# Patient Record
Sex: FEMALE | Race: ASIAN | Employment: FULL TIME | URBAN - METROPOLITAN AREA
[De-identification: names, ages, dates, MRNs, and addresses within clinical notes are randomized per-mention and may not be internally consistent; named-entity substitution may affect disease eponyms.]

---

## 2018-08-15 LAB
ANTIBODY SCREEN, EXTERNAL: NEGATIVE
CHLAMYDIA, EXTERNAL: NEGATIVE
HBSAG, EXTERNAL: NEGATIVE
HIV, EXTERNAL: NEGATIVE
N. GONORRHEA, EXTERNAL: NEGATIVE
RPR, EXTERNAL: NON REACTIVE
RUBELLA, EXTERNAL: NORMAL
TYPE, ABO & RH, EXTERNAL: NORMAL

## 2018-10-11 ENCOUNTER — HOSPITAL ENCOUNTER (EMERGENCY)
Age: 23
Discharge: HOME | End: 2018-10-11
Attending: EMERGENCY MEDICINE | Admitting: EMERGENCY MEDICINE
Payer: MEDICAID

## 2018-10-11 VITALS
HEART RATE: 76 BPM | DIASTOLIC BLOOD PRESSURE: 62 MMHG | WEIGHT: 99 LBS | BODY MASS INDEX: 18.22 KG/M2 | HEIGHT: 62 IN | OXYGEN SATURATION: 100 % | RESPIRATION RATE: 16 BRPM | SYSTOLIC BLOOD PRESSURE: 100 MMHG | TEMPERATURE: 97.6 F

## 2018-10-11 DIAGNOSIS — D72.829 LEUKOCYTOSIS, UNSPECIFIED TYPE: ICD-10-CM

## 2018-10-11 DIAGNOSIS — N39.0 URINARY TRACT INFECTION WITHOUT HEMATURIA, SITE UNSPECIFIED: Primary | ICD-10-CM

## 2018-10-11 LAB
ALBUMIN SERPL-MCNC: 3 G/DL (ref 3.4–5)
ALBUMIN/GLOB SERPL: 0.6 {RATIO} (ref 0.8–1.7)
ALP SERPL-CCNC: 82 U/L (ref 45–117)
ALT SERPL-CCNC: 15 U/L (ref 13–56)
ANION GAP SERPL CALC-SCNC: 11 MMOL/L (ref 3–18)
APPEARANCE UR: CLEAR
AST SERPL-CCNC: 14 U/L (ref 15–37)
BACTERIA URNS QL MICRO: ABNORMAL /HPF
BASOPHILS # BLD: 0 K/UL (ref 0–0.1)
BASOPHILS NFR BLD: 0 % (ref 0–2)
BILIRUB SERPL-MCNC: 0.3 MG/DL (ref 0.2–1)
BILIRUB UR QL: NEGATIVE
BUN SERPL-MCNC: 4 MG/DL (ref 7–18)
BUN/CREAT SERPL: 6 (ref 12–20)
CALCIUM SERPL-MCNC: 9.3 MG/DL (ref 8.5–10.1)
CHLORIDE SERPL-SCNC: 100 MMOL/L (ref 100–108)
CO2 SERPL-SCNC: 26 MMOL/L (ref 21–32)
COLOR UR: YELLOW
CREAT SERPL-MCNC: 0.66 MG/DL (ref 0.6–1.3)
DIFFERENTIAL METHOD BLD: ABNORMAL
EOSINOPHIL # BLD: 0 K/UL (ref 0–0.4)
EOSINOPHIL NFR BLD: 0 % (ref 0–5)
EPITH CASTS URNS QL MICRO: ABNORMAL /LPF (ref 0–5)
ERYTHROCYTE [DISTWIDTH] IN BLOOD BY AUTOMATED COUNT: 13 % (ref 11.6–14.5)
GLOBULIN SER CALC-MCNC: 4.7 G/DL (ref 2–4)
GLUCOSE SERPL-MCNC: 105 MG/DL (ref 74–99)
GLUCOSE UR STRIP.AUTO-MCNC: NEGATIVE MG/DL
HCT VFR BLD AUTO: 31.8 % (ref 35–45)
HGB BLD-MCNC: 10.9 G/DL (ref 12–16)
HGB UR QL STRIP: NEGATIVE
KETONES UR QL STRIP.AUTO: 40 MG/DL
LEUKOCYTE ESTERASE UR QL STRIP.AUTO: ABNORMAL
LYMPHOCYTES # BLD: 1.3 K/UL (ref 0.9–3.6)
LYMPHOCYTES NFR BLD: 8 % (ref 21–52)
MCH RBC QN AUTO: 28.8 PG (ref 24–34)
MCHC RBC AUTO-ENTMCNC: 34.3 G/DL (ref 31–37)
MCV RBC AUTO: 83.9 FL (ref 74–97)
MONOCYTES # BLD: 1.5 K/UL (ref 0.05–1.2)
MONOCYTES NFR BLD: 10 % (ref 3–10)
NEUTS SEG # BLD: 12.6 K/UL (ref 1.8–8)
NEUTS SEG NFR BLD: 82 % (ref 40–73)
NITRITE UR QL STRIP.AUTO: NEGATIVE
PH UR STRIP: 6.5 [PH] (ref 5–8)
PLATELET # BLD AUTO: 307 K/UL (ref 135–420)
PMV BLD AUTO: 9.3 FL (ref 9.2–11.8)
POTASSIUM SERPL-SCNC: 4 MMOL/L (ref 3.5–5.5)
PROT SERPL-MCNC: 7.7 G/DL (ref 6.4–8.2)
PROT UR STRIP-MCNC: NEGATIVE MG/DL
RBC # BLD AUTO: 3.79 M/UL (ref 4.2–5.3)
RBC #/AREA URNS HPF: ABNORMAL /HPF (ref 0–5)
SODIUM SERPL-SCNC: 137 MMOL/L (ref 136–145)
SP GR UR REFRACTOMETRY: 1 (ref 1–1.03)
UROBILINOGEN UR QL STRIP.AUTO: 0.2 EU/DL (ref 0.2–1)
WBC # BLD AUTO: 15.5 K/UL (ref 4.6–13.2)
WBC URNS QL MICRO: ABNORMAL /HPF (ref 0–5)

## 2018-10-11 PROCEDURE — 74011250637 HC RX REV CODE- 250/637: Performed by: PHYSICIAN ASSISTANT

## 2018-10-11 PROCEDURE — 96374 THER/PROPH/DIAG INJ IV PUSH: CPT

## 2018-10-11 PROCEDURE — 87077 CULTURE AEROBIC IDENTIFY: CPT | Performed by: PHYSICIAN ASSISTANT

## 2018-10-11 PROCEDURE — 74011000250 HC RX REV CODE- 250: Performed by: PHYSICIAN ASSISTANT

## 2018-10-11 PROCEDURE — 87086 URINE CULTURE/COLONY COUNT: CPT | Performed by: PHYSICIAN ASSISTANT

## 2018-10-11 PROCEDURE — 85025 COMPLETE CBC W/AUTO DIFF WBC: CPT | Performed by: PHYSICIAN ASSISTANT

## 2018-10-11 PROCEDURE — 74011250636 HC RX REV CODE- 250/636: Performed by: PHYSICIAN ASSISTANT

## 2018-10-11 PROCEDURE — 96361 HYDRATE IV INFUSION ADD-ON: CPT

## 2018-10-11 PROCEDURE — 80053 COMPREHEN METABOLIC PANEL: CPT | Performed by: PHYSICIAN ASSISTANT

## 2018-10-11 PROCEDURE — 87184 SC STD DISK METHOD PER PLATE: CPT | Performed by: PHYSICIAN ASSISTANT

## 2018-10-11 PROCEDURE — 81001 URINALYSIS AUTO W/SCOPE: CPT | Performed by: PHYSICIAN ASSISTANT

## 2018-10-11 PROCEDURE — 99283 EMERGENCY DEPT VISIT LOW MDM: CPT

## 2018-10-11 RX ORDER — NITROFURANTOIN 25; 75 MG/1; MG/1
100 CAPSULE ORAL 2 TIMES DAILY
Qty: 6 CAP | Refills: 0 | Status: SHIPPED | OUTPATIENT
Start: 2018-10-11 | End: 2018-10-14

## 2018-10-11 RX ADMIN — SODIUM CHLORIDE 1000 ML: 900 INJECTION, SOLUTION INTRAVENOUS at 22:29

## 2018-10-11 RX ADMIN — CEFTRIAXONE 1 G: 1 INJECTION, POWDER, FOR SOLUTION INTRAMUSCULAR; INTRAVENOUS at 22:28

## 2018-10-11 RX ADMIN — POTASSIUM BICARBONATE 50 MEQ: 25 TABLET, EFFERVESCENT ORAL at 21:16

## 2018-10-11 NOTE — ED TRIAGE NOTES
Pt arrives ambulatory to ED with c\o fever, pt denies cough, n/v/d, SOB, dizziness, CP, pt is able to make needs known speaking in complete sentences, pt in nad ta this time

## 2018-10-12 NOTE — DISCHARGE INSTRUCTIONS
Urinary Tract Infection in Pregnancy: Care Instructions  Your Care Instructions    A urinary tract infection, or UTI, is an infection of the bladder and other urinary structures. Most UTIs occur in the bladder. They often cause pain or burning when you urinate. UTI is the most common bacterial infection in pregnancy. If untreated, a UTI could lead to problems such as a kidney infection or  labor. Most UTIs can be cured with antibiotics. Your doctor will prescribe an antibiotic that is safe during pregnancy. Be sure to finish your medicine so that the infection does not spread to your kidneys. Follow-up care is a key part of your treatment and safety. Be sure to make and go to all appointments, and call your doctor if you are having problems. It's also a good idea to know your test results and keep a list of the medicines you take. How can you care for yourself at home? · Take your antibiotics as directed. Do not stop taking them just because you feel better. You need to take the full course of antibiotics. · Drink extra water and other fluids for the next day or two. This will help wash out the bacteria causing the infection. If you have kidney, heart, or liver disease and have to limit fluids, talk with your doctor before you increase the amount of fluids you drink. · Do not drink alcohol. · Urinate often. Try to empty your bladder each time. Preventing UTIs  · Drink plenty of fluids, enough so that your urine is light yellow or clear like water. This helps you urinate often, which clears bacteria from your system. If you have kidney, heart, or liver disease and have to limit fluids, talk with your doctor before you increase the amount of fluids you drink. · Urinate when you first have the urge. · Urinate right after you have sex. This is the best way for women to avoid UTIs. · When going to the bathroom, wipe from front to back to keep bacteria from entering the vagina or urethra.   When should you call for help? Call your doctor now or seek immediate medical care if:    · You have symptoms of a worse urinary tract infection. These may include:  ¨ Pain or burning when you urinate. ¨ A frequent need to urinate without being able to pass much urine. ¨ Pain in the flank, which is just below the rib cage and above the waist on either side of the back. ¨ Blood in your urine. ¨ A fever.    Watch closely for changes in your health, and be sure to contact your doctor if:    · You do not get better as expected. Where can you learn more? Go to http://cruz-nicole.info/. Enter M982 in the search box to learn more about \"Urinary Tract Infection in Pregnancy: Care Instructions. \"  Current as of: November 21, 2017  Content Version: 11.8  © 7388-4265 [employer], [employer]. Care instructions adapted under license by [employer] (which disclaims liability or warranty for this information). If you have questions about a medical condition or this instruction, always ask your healthcare professional. [place] [address] any warranty or liability for your use of this information.

## 2018-10-12 NOTE — ED PROVIDER NOTES
EMERGENCY DEPARTMENT HISTORY AND PHYSICAL EXAM 
 
Date: 10/11/2018 Patient Name: Joan Carlson History of Presenting Illness Chief Complaint Patient presents with  Fever History Provided By: Patient Chief Complaint: Fever Duration: 1 week Timing:  Progressive Location: General 
Severity: Tmax 102.5 F Modifying Factors: Fever is responsive to Tylenol Associated Symptoms:  back pain, chills, neck stiffness (resolved), vomiting (1 episode), dizziness (intermittent), and cough Additional History (Context):  
8:48 PM 
Rosmery Kimbrough is a 23 y.o. female with PMHX of anemia who presents to the emergency department C/O a fever at home that was responsive to Tylenol and continues to decrease. Associated sxs include lower back pain, chills, neck stiffness (x 1 day-resolved), vomiting (1 episode days ago), dizziness (intermittent). Reports that most of her cold symptoms have resolved but she still has a fever, chills, and body aches. Pt is 17 weeks pregnant (R8D8) without complications and has had a US ([facility]). Pt denies abdominal pain, vaginal bleeding, vaginal discharge, and any other sxs or complaints. PCP: Shanthi Goodman MD 
 
 
 
Past History Past Medical History: 
History reviewed. No pertinent past medical history. Past Surgical History: 
History reviewed. No pertinent surgical history. Family History: 
History reviewed. No pertinent family history. Social History: 
Social History Substance Use Topics  Smoking status: Never Smoker  Smokeless tobacco: Never Used  Alcohol use No  
 
 
Allergies: 
No Known Allergies Review of Systems Review of Systems Constitutional: Positive for chills and fever (Tmax 102.5 F). Respiratory: Positive for cough. Gastrointestinal: Positive for vomiting (1 episode). Negative for abdominal pain. Genitourinary: Negative for vaginal bleeding and vaginal discharge. Musculoskeletal: Positive for back pain, myalgias (body aches) and neck stiffness (resolved). Neurological: Positive for dizziness. All other systems reviewed and are negative. Physical Exam  
 
Vitals:  
 10/11/18 1950 BP: 99/60 Pulse: 86 Resp: 18 Temp: 97.6 °F (36.4 °C) SpO2: 100% Weight: 44.9 kg (99 lb) Height: 5' 2\" (1.575 m) Physical Exam  
Constitutional: She is oriented to person, place, and time. She appears well-developed and well-nourished. No distress. Alert, well appearing, non toxic, speaking in full sentences without difficulty HENT:  
Head: Normocephalic and atraumatic. Right Ear: Tympanic membrane, external ear and ear canal normal. Tympanic membrane is not perforated, not erythematous, not retracted and not bulging. Left Ear: Tympanic membrane, external ear and ear canal normal. Tympanic membrane is not perforated, not erythematous, not retracted and not bulging. Nose: Nose normal. No mucosal edema or rhinorrhea. Right sinus exhibits no maxillary sinus tenderness and no frontal sinus tenderness. Left sinus exhibits no maxillary sinus tenderness and no frontal sinus tenderness. Mouth/Throat: Uvula is midline, oropharynx is clear and moist and mucous membranes are normal. Mucous membranes are not dry. No uvula swelling. No oropharyngeal exudate, posterior oropharyngeal edema, posterior oropharyngeal erythema or tonsillar abscesses. Neck: Normal range of motion. Neck supple. Cardiovascular: Normal rate, regular rhythm, normal heart sounds and intact distal pulses. No murmur heard. Pulmonary/Chest: Effort normal and breath sounds normal. No respiratory distress. She has no wheezes. She has no rales. Abdominal: Soft. Bowel sounds are normal. She exhibits no distension. There is no tenderness. There is no rebound and no guarding. Lymphadenopathy:  
  She has no cervical adenopathy. Neurological: She is alert and oriented to person, place, and time. Skin: Skin is warm and dry. No rash noted. Psychiatric: She has a normal mood and affect. Judgment normal.  
Nursing note and vitals reviewed. Diagnostic Study Results Labs - Recent Results (from the past 12 hour(s)) URINALYSIS W/ RFLX MICROSCOPIC Collection Time: 10/11/18  9:00 PM  
Result Value Ref Range Color YELLOW Appearance CLEAR Specific gravity 1.005 1.005 - 1.030    
 pH (UA) 6.5 5.0 - 8.0 Protein NEGATIVE  NEG mg/dL Glucose NEGATIVE  NEG mg/dL Ketone 40 (A) NEG mg/dL Bilirubin NEGATIVE  NEG Blood NEGATIVE  NEG Urobilinogen 0.2 0.2 - 1.0 EU/dL Nitrites NEGATIVE  NEG Leukocyte Esterase MODERATE (A) NEG URINE MICROSCOPIC ONLY Collection Time: 10/11/18  9:00 PM  
Result Value Ref Range WBC 11 to 20 0 - 5 /hpf  
 RBC 0 to 3 0 - 5 /hpf Epithelial cells FEW 0 - 5 /lpf Bacteria 2+ (A) NEG /hpf  
CBC WITH AUTOMATED DIFF Collection Time: 10/11/18  9:50 PM  
Result Value Ref Range WBC 15.5 (H) 4.6 - 13.2 K/uL  
 RBC 3.79 (L) 4.20 - 5.30 M/uL  
 HGB 10.9 (L) 12.0 - 16.0 g/dL HCT 31.8 (L) 35.0 - 45.0 % MCV 83.9 74.0 - 97.0 FL  
 MCH 28.8 24.0 - 34.0 PG  
 MCHC 34.3 31.0 - 37.0 g/dL  
 RDW 13.0 11.6 - 14.5 % PLATELET 152 008 - 627 K/uL MPV 9.3 9.2 - 11.8 FL  
 NEUTROPHILS 82 (H) 40 - 73 % LYMPHOCYTES 8 (L) 21 - 52 % MONOCYTES 10 3 - 10 % EOSINOPHILS 0 0 - 5 % BASOPHILS 0 0 - 2 %  
 ABS. NEUTROPHILS 12.6 (H) 1.8 - 8.0 K/UL  
 ABS. LYMPHOCYTES 1.3 0.9 - 3.6 K/UL  
 ABS. MONOCYTES 1.5 (H) 0.05 - 1.2 K/UL  
 ABS. EOSINOPHILS 0.0 0.0 - 0.4 K/UL  
 ABS. BASOPHILS 0.0 0.0 - 0.1 K/UL  
 DF AUTOMATED METABOLIC PANEL, COMPREHENSIVE Collection Time: 10/11/18  9:50 PM  
Result Value Ref Range Sodium 137 136 - 145 mmol/L Potassium 4.0 3.5 - 5.5 mmol/L Chloride 100 100 - 108 mmol/L  
 CO2 26 21 - 32 mmol/L Anion gap 11 3.0 - 18 mmol/L Glucose 105 (H) 74 - 99 mg/dL  BUN 4 (L) 7.0 - 18 MG/DL  
 Creatinine 0.66 0.6 - 1.3 MG/DL  
 BUN/Creatinine ratio 6 (L) 12 - 20 GFR est AA >60 >60 ml/min/1.73m2 GFR est non-AA >60 >60 ml/min/1.73m2 Calcium 9.3 8.5 - 10.1 MG/DL Bilirubin, total 0.3 0.2 - 1.0 MG/DL  
 ALT (SGPT) 15 13 - 56 U/L  
 AST (SGOT) 14 (L) 15 - 37 U/L Alk. phosphatase 82 45 - 117 U/L Protein, total 7.7 6.4 - 8.2 g/dL Albumin 3.0 (L) 3.4 - 5.0 g/dL Globulin 4.7 (H) 2.0 - 4.0 g/dL A-G Ratio 0.6 (L) 0.8 - 1.7 Radiologic Studies - No orders to display CT Results  (Last 48 hours) None CXR Results  (Last 48 hours) None Medications given in the ED- Medications  
potassium bicarbonate (KLYTE) tablet 50 mEq (50 mEq Oral Given 10/11/18 2116) cefTRIAXone (ROCEPHIN) 1 g in sterile water (preservative free) 10 mL IV syringe (1 g IntraVENous Given 10/11/18 2228)  
sodium chloride 0.9 % bolus infusion 1,000 mL (0 mL IntraVENous IV Completed 10/11/18 2300) Medical Decision Making I am the first provider for this patient. I reviewed the vital signs, available nursing notes, past medical history, past surgical history, family history and social history. Vital Signs-Reviewed the patient's vital signs. Pulse Oximetry Analysis - 100% on RA Records Reviewed: Nursing Notes and Old Medical Records Provider Notes (Medical Decision Making):  
Pt 17 weeks pregnant, US confirmed IUP, presents with intermittent fever this week. Had URI sxs a week ago-now resolved. One episode of vomiting several days ago. Denies any sxs at present. She is well appearing, NAD, non toxic. [facility] labs performed there and sent for K+ of 3.0, Na+ 132, WBC 14.6 UA no protein, mod WBC est, bact 3+ Will give PO K+ and recheck UA She denies any pregnancy related sxs (abd pain, vag bleeding or discharge) Procedures: 
Procedures ED Course:  
8:48 PM Initial assessment performed.  The patients presenting problems have been discussed, and they are in agreement with the care plan formulated and outlined with them. I have encouraged them to ask questions as they arise throughout their visit. 11:15 PM Patient is feeling better, still w/o complaints. Pt received dose of Rocephin in ED. Will tx with Macrobid. Remains afebrile in ED. Strict return precautions given. Pt understands and agrees. Diagnosis and Disposition DISCHARGE NOTE: 
11:18 PM 
Rosmery Sims's  results have been reviewed with her. She has been counseled regarding her diagnosis, treatment, and plan. She verbally conveys understanding and agreement of the signs, symptoms, diagnosis, treatment and prognosis and additionally agrees to follow up as discussed. She also agrees with the care-plan and conveys that all of her questions have been answered. I have also provided discharge instructions for her that include: educational information regarding their diagnosis and treatment, and list of reasons why they would want to return to the ED prior to their follow-up appointment, should her condition change. She has been provided with education for proper emergency department utilization. CLINICAL IMPRESSION: 
 
1. Urinary tract infection without hematuria, site unspecified 2. Leukocytosis, unspecified type PLAN: 
1. D/C Home 2. Current Discharge Medication List  
  
START taking these medications Details  
nitrofurantoin, macrocrystal-monohydrate, (MACROBID) 100 mg capsule Take 1 Cap by mouth two (2) times a day for 3 days. Qty: 6 Cap, Refills: 0  
  
  
 
3. Follow-up Information Follow up With Details Comments Contact Info Carmen Ruiz MD Schedule an appointment as soon as possible for a visit in 2 days For OB/GYN follow up [facility] [place] [address] 
[phone] [facility] EMERGENCY DEPT Go to As needed, If symptoms worsen [address] [place] [address] 
[phone] _______________________________ Attestations: This note is prepared by Guillermina Montoya, acting as Scribe for Damion Ingram PA-C. Damion Ingram PA-C:  The scribe's documentation has been prepared under my direction and personally reviewed by me in its entirety. I confirm that the note above accurately reflects all work, treatment, procedures, and medical decision making performed by me. 
_______________________________

## 2018-10-15 LAB
BACTERIA SPEC CULT: ABNORMAL
SERVICE CMNT-IMP: ABNORMAL

## 2019-02-11 LAB — GRBS, EXTERNAL: NEGATIVE

## 2019-03-11 ENCOUNTER — HOSPITAL ENCOUNTER (INPATIENT)
Age: 24
LOS: 2 days | Discharge: HOME | End: 2019-03-13
Attending: OBSTETRICS & GYNECOLOGY | Admitting: OBSTETRICS & GYNECOLOGY
Payer: COMMERCIAL

## 2019-03-11 ENCOUNTER — ANESTHESIA (OUTPATIENT)
Dept: LABOR AND DELIVERY | Age: 24
End: 2019-03-11
Payer: COMMERCIAL

## 2019-03-11 ENCOUNTER — ANESTHESIA EVENT (OUTPATIENT)
Dept: LABOR AND DELIVERY | Age: 24
End: 2019-03-11
Payer: COMMERCIAL

## 2019-03-11 PROBLEM — Z34.90 INTRAUTERINE PREGNANCY: Status: ACTIVE | Noted: 2019-03-11

## 2019-03-11 PROBLEM — Z3A.39 39 WEEKS GESTATION OF PREGNANCY: Status: ACTIVE | Noted: 2019-03-11

## 2019-03-11 PROBLEM — Z37.9 NORMAL LABOR: Status: ACTIVE | Noted: 2019-03-11

## 2019-03-11 LAB
ABO + RH BLD: NORMAL
BASOPHILS # BLD: 0 K/UL (ref 0–0.1)
BASOPHILS NFR BLD: 0 % (ref 0–2)
BLOOD GROUP ANTIBODIES SERPL: NORMAL
DIFFERENTIAL METHOD BLD: ABNORMAL
EOSINOPHIL # BLD: 0.1 K/UL (ref 0–0.4)
EOSINOPHIL NFR BLD: 1 % (ref 0–5)
ERYTHROCYTE [DISTWIDTH] IN BLOOD BY AUTOMATED COUNT: 14.4 % (ref 11.6–14.5)
HCT VFR BLD AUTO: 41.8 % (ref 35–45)
HGB BLD-MCNC: 13.5 G/DL (ref 12–16)
LYMPHOCYTES # BLD: 1.8 K/UL (ref 0.9–3.6)
LYMPHOCYTES NFR BLD: 16 % (ref 21–52)
MCH RBC QN AUTO: 28.7 PG (ref 24–34)
MCHC RBC AUTO-ENTMCNC: 32.3 G/DL (ref 31–37)
MCV RBC AUTO: 88.7 FL (ref 74–97)
MONOCYTES # BLD: 0.8 K/UL (ref 0.05–1.2)
MONOCYTES NFR BLD: 7 % (ref 3–10)
NEUTS SEG # BLD: 8.2 K/UL (ref 1.8–8)
NEUTS SEG NFR BLD: 76 % (ref 40–73)
PLATELET # BLD AUTO: 253 K/UL (ref 135–420)
PMV BLD AUTO: 10.4 FL (ref 9.2–11.8)
RBC # BLD AUTO: 4.71 M/UL (ref 4.2–5.3)
SPECIMEN EXP DATE BLD: NORMAL
WBC # BLD AUTO: 10.8 K/UL (ref 4.6–13.2)

## 2019-03-11 PROCEDURE — 74011250636 HC RX REV CODE- 250/636: Performed by: OBSTETRICS & GYNECOLOGY

## 2019-03-11 PROCEDURE — 77030018749 HC HK AMNIO DISP DERY -A

## 2019-03-11 PROCEDURE — 76060000078 HC EPIDURAL ANESTHESIA

## 2019-03-11 PROCEDURE — 75410000000 HC DELIVERY VAGINAL/SINGLE

## 2019-03-11 PROCEDURE — 74011250637 HC RX REV CODE- 250/637: Performed by: OBSTETRICS & GYNECOLOGY

## 2019-03-11 PROCEDURE — 74011250636 HC RX REV CODE- 250/636

## 2019-03-11 PROCEDURE — 77030007879 HC KT SPN EPDRL TELE -B: Performed by: ANESTHESIOLOGY

## 2019-03-11 PROCEDURE — 77030034849

## 2019-03-11 PROCEDURE — 75410000003 HC RECOV DEL/VAG/CSECN EA 0.5 HR

## 2019-03-11 PROCEDURE — 85025 COMPLETE CBC W/AUTO DIFF WBC: CPT

## 2019-03-11 PROCEDURE — 3E033VJ INTRODUCTION OF OTHER HORMONE INTO PERIPHERAL VEIN, PERCUTANEOUS APPROACH: ICD-10-PCS | Performed by: OBSTETRICS & GYNECOLOGY

## 2019-03-11 PROCEDURE — 75410000002 HC LABOR FEE PER 1 HR

## 2019-03-11 PROCEDURE — 0HQ9XZZ REPAIR PERINEUM SKIN, EXTERNAL APPROACH: ICD-10-PCS | Performed by: OBSTETRICS & GYNECOLOGY

## 2019-03-11 PROCEDURE — 86900 BLOOD TYPING SEROLOGIC ABO: CPT

## 2019-03-11 PROCEDURE — 74011000250 HC RX REV CODE- 250

## 2019-03-11 PROCEDURE — 36415 COLL VENOUS BLD VENIPUNCTURE: CPT

## 2019-03-11 PROCEDURE — 77030011943

## 2019-03-11 PROCEDURE — 88307 TISSUE EXAM BY PATHOLOGIST: CPT

## 2019-03-11 PROCEDURE — 65270000029 HC RM PRIVATE

## 2019-03-11 PROCEDURE — 10907ZC DRAINAGE OF AMNIOTIC FLUID, THERAPEUTIC FROM PRODUCTS OF CONCEPTION, VIA NATURAL OR ARTIFICIAL OPENING: ICD-10-PCS | Performed by: OBSTETRICS & GYNECOLOGY

## 2019-03-11 RX ORDER — LANOLIN ALCOHOL/MO/W.PET/CERES
325 CREAM (GRAM) TOPICAL
COMMUNITY

## 2019-03-11 RX ORDER — FENTANYL/ROPIVACAINE/NS/PF 2MCG/ML-.1
PLASTIC BAG, INJECTION (ML) EPIDURAL
Status: COMPLETED
Start: 2019-03-11 | End: 2019-03-11

## 2019-03-11 RX ORDER — OXYTOCIN/0.9 % SODIUM CHLORIDE 30/500 ML
PLASTIC BAG, INJECTION (ML) INTRAVENOUS
Status: COMPLETED
Start: 2019-03-11 | End: 2019-03-11

## 2019-03-11 RX ORDER — IBUPROFEN 400 MG/1
800 TABLET ORAL
Status: DISCONTINUED | OUTPATIENT
Start: 2019-03-11 | End: 2019-03-13 | Stop reason: HOSPADM

## 2019-03-11 RX ORDER — PHENYLEPHRINE HCL IN 0.9% NACL 1 MG/10 ML
80 SYRINGE (ML) INTRAVENOUS AS NEEDED
Status: DISCONTINUED | OUTPATIENT
Start: 2019-03-11 | End: 2019-03-11 | Stop reason: HOSPADM

## 2019-03-11 RX ORDER — PROMETHAZINE HYDROCHLORIDE 25 MG/ML
25 INJECTION, SOLUTION INTRAMUSCULAR; INTRAVENOUS
Status: DISCONTINUED | OUTPATIENT
Start: 2019-03-11 | End: 2019-03-13 | Stop reason: HOSPADM

## 2019-03-11 RX ORDER — OXYTOCIN/RINGER'S LACTATE 20/1000 ML
125 PLASTIC BAG, INJECTION (ML) INTRAVENOUS CONTINUOUS
Status: DISCONTINUED | OUTPATIENT
Start: 2019-03-11 | End: 2019-03-11 | Stop reason: HOSPADM

## 2019-03-11 RX ORDER — LIDOCAINE HYDROCHLORIDE 10 MG/ML
20 INJECTION, SOLUTION EPIDURAL; INFILTRATION; INTRACAUDAL; PERINEURAL AS NEEDED
Status: DISCONTINUED | OUTPATIENT
Start: 2019-03-11 | End: 2019-03-11 | Stop reason: HOSPADM

## 2019-03-11 RX ORDER — LIDOCAINE HYDROCHLORIDE 10 MG/ML
INJECTION INFILTRATION; PERINEURAL
Status: DISPENSED
Start: 2019-03-11 | End: 2019-03-11

## 2019-03-11 RX ORDER — FENTANYL CITRATE 50 UG/ML
INJECTION, SOLUTION INTRAMUSCULAR; INTRAVENOUS
Status: COMPLETED
Start: 2019-03-11 | End: 2019-03-11

## 2019-03-11 RX ORDER — FENTANYL/ROPIVACAINE/NS/PF 2MCG/ML-.1
1-15 PLASTIC BAG, INJECTION (ML) EPIDURAL
Status: DISCONTINUED | OUTPATIENT
Start: 2019-03-11 | End: 2019-03-11 | Stop reason: HOSPADM

## 2019-03-11 RX ORDER — SODIUM CHLORIDE 0.9 % (FLUSH) 0.9 %
5-40 SYRINGE (ML) INJECTION EVERY 8 HOURS
Status: DISCONTINUED | OUTPATIENT
Start: 2019-03-11 | End: 2019-03-11 | Stop reason: HOSPADM

## 2019-03-11 RX ORDER — ONDANSETRON 2 MG/ML
4 INJECTION INTRAMUSCULAR; INTRAVENOUS
Status: DISCONTINUED | OUTPATIENT
Start: 2019-03-11 | End: 2019-03-11 | Stop reason: HOSPADM

## 2019-03-11 RX ORDER — HYDROCORTISONE 25 MG/G
CREAM TOPICAL AS NEEDED
Status: DISCONTINUED | OUTPATIENT
Start: 2019-03-11 | End: 2019-03-13 | Stop reason: HOSPADM

## 2019-03-11 RX ORDER — OXYCODONE AND ACETAMINOPHEN 5; 325 MG/1; MG/1
2 TABLET ORAL
Status: DISCONTINUED | OUTPATIENT
Start: 2019-03-11 | End: 2019-03-13 | Stop reason: HOSPADM

## 2019-03-11 RX ORDER — OXYTOCIN/RINGER'S LACTATE 20/1000 ML
999 PLASTIC BAG, INJECTION (ML) INTRAVENOUS ONCE
Status: COMPLETED | OUTPATIENT
Start: 2019-03-11 | End: 2019-03-11

## 2019-03-11 RX ORDER — FENTANYL CITRATE 50 UG/ML
INJECTION, SOLUTION INTRAMUSCULAR; INTRAVENOUS AS NEEDED
Status: DISCONTINUED | OUTPATIENT
Start: 2019-03-11 | End: 2019-03-11 | Stop reason: HOSPADM

## 2019-03-11 RX ORDER — CHOLECALCIFEROL (VITAMIN D3) 125 MCG
1 CAPSULE ORAL
COMMUNITY

## 2019-03-11 RX ORDER — ZOLPIDEM TARTRATE 5 MG/1
5 TABLET ORAL
Status: DISCONTINUED | OUTPATIENT
Start: 2019-03-11 | End: 2019-03-13 | Stop reason: HOSPADM

## 2019-03-11 RX ORDER — NALBUPHINE HYDROCHLORIDE 10 MG/ML
10 INJECTION, SOLUTION INTRAMUSCULAR; INTRAVENOUS; SUBCUTANEOUS
Status: DISCONTINUED | OUTPATIENT
Start: 2019-03-11 | End: 2019-03-11 | Stop reason: HOSPADM

## 2019-03-11 RX ORDER — NALOXONE HYDROCHLORIDE 0.4 MG/ML
0.2 INJECTION, SOLUTION INTRAMUSCULAR; INTRAVENOUS; SUBCUTANEOUS AS NEEDED
Status: DISCONTINUED | OUTPATIENT
Start: 2019-03-11 | End: 2019-03-11 | Stop reason: HOSPADM

## 2019-03-11 RX ORDER — MINERAL OIL
30 OIL (ML) ORAL AS NEEDED
Status: DISCONTINUED | OUTPATIENT
Start: 2019-03-11 | End: 2019-03-11 | Stop reason: HOSPADM

## 2019-03-11 RX ORDER — BUTORPHANOL TARTRATE 2 MG/ML
2 INJECTION INTRAMUSCULAR; INTRAVENOUS
Status: DISCONTINUED | OUTPATIENT
Start: 2019-03-11 | End: 2019-03-11 | Stop reason: HOSPADM

## 2019-03-11 RX ORDER — OXYTOCIN/0.9 % SODIUM CHLORIDE 30/500 ML
0-20 PLASTIC BAG, INJECTION (ML) INTRAVENOUS
Status: DISCONTINUED | OUTPATIENT
Start: 2019-03-11 | End: 2019-03-13 | Stop reason: HOSPADM

## 2019-03-11 RX ORDER — ACETAMINOPHEN 325 MG/1
650 TABLET ORAL
Status: DISCONTINUED | OUTPATIENT
Start: 2019-03-11 | End: 2019-03-13 | Stop reason: HOSPADM

## 2019-03-11 RX ORDER — HYDROMORPHONE HYDROCHLORIDE 1 MG/ML
1 INJECTION, SOLUTION INTRAMUSCULAR; INTRAVENOUS; SUBCUTANEOUS
Status: DISCONTINUED | OUTPATIENT
Start: 2019-03-11 | End: 2019-03-11 | Stop reason: HOSPADM

## 2019-03-11 RX ORDER — TERBUTALINE SULFATE 1 MG/ML
0.25 INJECTION SUBCUTANEOUS
Status: DISCONTINUED | OUTPATIENT
Start: 2019-03-11 | End: 2019-03-11 | Stop reason: HOSPADM

## 2019-03-11 RX ORDER — AMOXICILLIN 250 MG
1 CAPSULE ORAL
Status: DISCONTINUED | OUTPATIENT
Start: 2019-03-11 | End: 2019-03-13 | Stop reason: HOSPADM

## 2019-03-11 RX ORDER — FENTANYL CITRATE 50 UG/ML
100 INJECTION, SOLUTION INTRAMUSCULAR; INTRAVENOUS ONCE
Status: DISCONTINUED | OUTPATIENT
Start: 2019-03-11 | End: 2019-03-11 | Stop reason: HOSPADM

## 2019-03-11 RX ORDER — BUPIVACAINE HYDROCHLORIDE 2.5 MG/ML
INJECTION, SOLUTION EPIDURAL; INFILTRATION; INTRACAUDAL AS NEEDED
Status: DISCONTINUED | OUTPATIENT
Start: 2019-03-11 | End: 2019-03-11 | Stop reason: HOSPADM

## 2019-03-11 RX ORDER — METHYLERGONOVINE MALEATE 0.2 MG/ML
0.2 INJECTION INTRAVENOUS AS NEEDED
Status: DISCONTINUED | OUTPATIENT
Start: 2019-03-11 | End: 2019-03-11 | Stop reason: HOSPADM

## 2019-03-11 RX ORDER — SODIUM CHLORIDE 0.9 % (FLUSH) 0.9 %
5-40 SYRINGE (ML) INJECTION AS NEEDED
Status: DISCONTINUED | OUTPATIENT
Start: 2019-03-11 | End: 2019-03-11 | Stop reason: HOSPADM

## 2019-03-11 RX ORDER — LIDOCAINE HYDROCHLORIDE 10 MG/ML
INJECTION INFILTRATION; PERINEURAL AS NEEDED
Status: DISCONTINUED | OUTPATIENT
Start: 2019-03-11 | End: 2019-03-11 | Stop reason: HOSPADM

## 2019-03-11 RX ORDER — LIDOCAINE HYDROCHLORIDE AND EPINEPHRINE 15; 5 MG/ML; UG/ML
INJECTION, SOLUTION EPIDURAL AS NEEDED
Status: DISCONTINUED | OUTPATIENT
Start: 2019-03-11 | End: 2019-03-11 | Stop reason: HOSPADM

## 2019-03-11 RX ORDER — SODIUM CHLORIDE, SODIUM LACTATE, POTASSIUM CHLORIDE, CALCIUM CHLORIDE 600; 310; 30; 20 MG/100ML; MG/100ML; MG/100ML; MG/100ML
125 INJECTION, SOLUTION INTRAVENOUS CONTINUOUS
Status: DISCONTINUED | OUTPATIENT
Start: 2019-03-11 | End: 2019-03-11 | Stop reason: HOSPADM

## 2019-03-11 RX ADMIN — FENTANYL CITRATE 100 MCG: 50 INJECTION, SOLUTION INTRAMUSCULAR; INTRAVENOUS at 09:16

## 2019-03-11 RX ADMIN — LIDOCAINE HYDROCHLORIDE AND EPINEPHRINE 3 ML: 15; 5 INJECTION, SOLUTION EPIDURAL at 09:16

## 2019-03-11 RX ADMIN — Medication 10 ML/HR: at 09:16

## 2019-03-11 RX ADMIN — SODIUM CHLORIDE, SODIUM LACTATE, POTASSIUM CHLORIDE, AND CALCIUM CHLORIDE 125 ML/HR: 600; 310; 30; 20 INJECTION, SOLUTION INTRAVENOUS at 08:03

## 2019-03-11 RX ADMIN — LIDOCAINE HYDROCHLORIDE 3 ML: 10 INJECTION INFILTRATION; PERINEURAL at 09:11

## 2019-03-11 RX ADMIN — BUPIVACAINE HYDROCHLORIDE 8 ML: 2.5 INJECTION, SOLUTION EPIDURAL; INFILTRATION; INTRACAUDAL at 09:14

## 2019-03-11 RX ADMIN — ROPIVACAINE HYDROCHLORIDE 10 ML/HR: 10 INJECTION, SOLUTION EPIDURAL at 09:16

## 2019-03-11 RX ADMIN — Medication 19980 MILLI-UNITS/HR: at 16:30

## 2019-03-11 RX ADMIN — SODIUM CHLORIDE, SODIUM LACTATE, POTASSIUM CHLORIDE, AND CALCIUM CHLORIDE 125 ML/HR: 600; 310; 30; 20 INJECTION, SOLUTION INTRAVENOUS at 09:55

## 2019-03-11 RX ADMIN — Medication 4 MILLI-UNITS/MIN: at 16:21

## 2019-03-11 RX ADMIN — IBUPROFEN 800 MG: 400 TABLET, FILM COATED ORAL at 22:06

## 2019-03-11 RX ADMIN — SODIUM CHLORIDE, SODIUM LACTATE, POTASSIUM CHLORIDE, AND CALCIUM CHLORIDE 125 ML/HR: 600; 310; 30; 20 INJECTION, SOLUTION INTRAVENOUS at 08:49

## 2019-03-11 NOTE — PROGRESS NOTES
0615 Pt arrived  at 38w6d with C/O contractions. Pt placed in bed on EFM oriented to room with call bell in reach    0620 SVE /-2    0730 Bedside and Verbal shift change report given to THALIA Coon RN (oncoming nurse) by Nisha Victoria RN (offgoing nurse). Report included the following information SBAR, Kardex, Procedure Summary, Intake/Output, MAR, Accordion, Recent Results, Med Rec Status and Quality Measures.

## 2019-03-11 NOTE — H&P
Ostetrical History and Physical    Subjective:     Date of Admission: 3/11/2019    Patient is a 20 y.o.   female admitted with labor at 39w. For Obstetric history, see lexusl.    For Review of Systems, see prenatal    Past Medical History:   Diagnosis Date    Anemia       History reviewed. No pertinent surgical history. Prior to Admission medications    Medication Sig Start Date End Date Taking? Authorizing Provider   PNV66-Iron Fumarate-FA-DSS-DHA 26-1.2- mg cap Take 1 Tab by mouth. Yes Provider, Historical   cholecalciferol, vitamin D3, (VITAMIN D3) 2,000 unit tab Take 1 Tab by mouth. Yes Provider, Historical   ferrous sulfate (IRON) 325 mg (65 mg iron) tablet Take 325 mg by mouth Daily (before breakfast). Yes Provider, Historical     No Known Allergies   Social History     Tobacco Use    Smoking status: Never Smoker    Smokeless tobacco: Never Used   Substance Use Topics    Alcohol use: No      History reviewed. No pertinent family history. Objective:     Blood pressure 137/77, pulse 62, temperature 98.4 °F (36.9 °C), resp. rate 18, height 5' 2\" (1.575 m), weight 62.1 kg (137 lb), last menstrual period 2018. Temp (24hrs), Av.4 °F (36.9 °C), Min:98.4 °F (36.9 °C), Max:98.4 °F (36.9 °C)        No intake/output data recorded. No intake/output data recorded. HEENT: No pallor, no jaundice, Thyroid and throat normal  RESPIRATORY: Clear to A & P  CVS: pulse reg, HS normal  ABDOMEN: Gravid. Vertex. EFW=7lb +-1lb. No abnormal tenderness. Pelvic: Cervix 5,   Effaced: 80%  Station:  -1  Data Review:   No results found for this or any previous visit (from the past 24 hour(s)).   Monitor:  Reactivity:present Variability:present Baseline:within normal limits    Assessment:     Active Problems:    Normal labor (3/11/2019)      39 weeks gestation of pregnancy (3/11/2019)        Plan:   Epidural      Check labs:  CBC  Check  Prenatal:    Disposition:     Type of admit: In-Patient    I saw and examined patient.     Signed By: Teresa Limon MD                         March 11, 2019

## 2019-03-11 NOTE — PROGRESS NOTES
4025 Dr. Luzma Linda at bedside discussing plan of care. SVE /-1. Received orders to admit patient. 0730 Bedside and Verbal shift change report given to [facility], RN (oncoming nurse) by Katherine Rivera RN (offgoing nurse). Report included the following information SBAR, Kardex, Intake/Output, MAR and Recent Results. 2714 Ambulated with patient to labor room 8.    0750 Consents signed    0800 IV initiated    0852 SVE. 1. Patient requesting epidural    2651 Dr. Daja Castaneda paged    2851 Dr. Luzma Linda text paged with updated SVE    9519 Dr. Daja Castaneda at bedside explaining epidural procedure, side effects, risks, benefits, and positioning. Patient verbalizes understanding. Time-out: 913  Procedure start: 913  Bolus: 4621  Catheter in, needle out: 0915  Test dose: 0916  Fentanyl vial handed to MD at bedside. Loading dose: 0916  Patient connected to pump: 0918    1112 Neumann placed. EFM adjusted. [address] [facility] Dr. Luzma Linda called unit for update    1140 SVE 7-8/100/0    1142 Dr. Luzma Linda updated on SVE    1204 Dr. Luzma Linda at bedside. AROM moderate amount of blood tinged fluid. SVE 8100/-1    1310 SVE 9/100/0    1314 Dr. Luzma Linda updated on SVE    1345 SVE anterior lip    1430 SVE unchanged    1436 Dr. Luzma Linda updated on SVE    1442 SVE complete    1452 Dr. Luzma Linda updated on SVE and would like the patient to start pushing    1455 RN at bedside pushing with patient and monitoring FHT    1500 Neumann removed    1520 Pushing with RN stopped. Laboring down. 1610 Received orders to start pitocin at 4 milliunits    1620 Dr Luzma Linda at bedside    1622 Pushing started. MD and RN at bedside monitoring FHT until delivery    1628  of viable female infant. No nuchal noted. 30 second hesitation from delivery of head to delivery of should, physician did not use the term dystocia. Spontaneous cry noted. Tactile stimulation and bulb suction applied. Infant placed skin to skin on mother's abdomen. 1629 Cord clamped and cut.  Infant placed skin to skin on mother's chest    1631  of placenta. Sent to pathology for fetal weight    1632 Repair started    7964 Repair complete    1642 Elizabeth care provided. Ice pack and pad applied. 1740 Assisted patient onto bedpan. Patient unable to void at this time. 180 Patient straight catheterized. 1850 Epidural catheter removed.  Assisted patient to restroom. Patient unable to void. Ice pack, pad, mesh panties, and new gown provided. 1930 TRANSFER - OUT REPORT:    Verbal report given to WAGNER Voss RN (name) on [facility]  being transferred to Mother/Baby (unit) for routine progression of care       Report consisted of patients Situation, Background, Assessment and   Recommendations(SBAR). Information from the following report(s) SBAR, Kardex, Intake/Output, MAR and Recent Results was reviewed with the receiving nurse. Lines:   Peripheral IV 19 Anterior;Distal;Right Forearm (Active)   Site Assessment Clean, dry, & intact 3/11/2019  8:10 AM   Phlebitis Assessment 0 3/11/2019  8:10 AM   Infiltration Assessment 0 3/11/2019  8:10 AM   Dressing Status Clean, dry, & intact 3/11/2019  8:10 AM   Dressing Type Tape;Transparent 3/11/2019  8:10 AM   Hub Color/Line Status Pink; Infusing;Capped 3/11/2019  8:10 AM   Alcohol Cap Used Yes 3/11/2019  8:10 AM        Opportunity for questions and clarification was provided.

## 2019-03-11 NOTE — ANESTHESIA PROCEDURE NOTES
Epidural Block    Start time: 3/11/2019 9:06 AM  End time: 3/11/2019 9:21 AM  Performed by: Kevin De La Fuente MD  Authorized by: Kevin De La Fuente MD     Pre-Procedure  Indication: labor epidural    Preanesthetic Checklist: patient identified, risks and benefits discussed, anesthesia consent, patient being monitored, timeout performed and anesthesia consent      Epidural:   Patient position:  Seated  Prep region:  Lumbar  Prep: Chlorhexidine    Location:  L3-4    Needle and Epidural Catheter:   Needle Type:  Tuohy  Needle Gauge:  17 G  Injection Technique:  Loss of resistance using air  Attempts:  1  Catheter Size:  20 G  Catheter at Skin Depth (cm):  10  Events: no blood with aspiration, no cerebrospinal fluid with aspiration, no paresthesia and negative aspiration test    Test Dose:  Negative and lidocaine 1.5% w/ epi    Assessment:   Catheter Secured:  Tegaderm and tape  Insertion:  Uncomplicated  Patient tolerance:  Patient tolerated the procedure well with no immediate complications

## 2019-03-11 NOTE — ANESTHESIA PREPROCEDURE EVALUATION
Anesthetic History   No history of anesthetic complications            Review of Systems / Medical History  Patient summary reviewed, nursing notes reviewed and pertinent labs reviewed    Pulmonary  Within defined limits                 Neuro/Psych   Within defined limits           Cardiovascular                  Exercise tolerance: >4 METS     GI/Hepatic/Renal  Within defined limits              Endo/Other  Within defined limits           Other Findings              Physical Exam    Airway  Mallampati: II  TM Distance: 4 - 6 cm  Neck ROM: normal range of motion   Mouth opening: Normal     Cardiovascular  Regular rate and rhythm,  S1 and S2 normal,  no murmur, click, rub, or gallop  Rhythm: regular  Rate: normal         Dental  No notable dental hx       Pulmonary  Breath sounds clear to auscultation               Abdominal  GI exam deferred       Other Findings            Anesthetic Plan    ASA: 2  Anesthesia type: epidural            Anesthetic plan and risks discussed with: Patient

## 2019-03-11 NOTE — L&D DELIVERY NOTE
Vaginal Delivery Procedure Note    Name: Janessa Espinosa   Medical Record Number: [id number]   YOB: 1995  Today's Date: 2019        Procedure: VAGINAL DELIVERY without suction or forceps       Anesthesia:  epidural    Extra Procedure Details:  Delay at anterior shoulder (L), resolved by posterior rotation (Rside). No traction at all on fetal head     Estimated Blood Loss: 250    Fetal Description: ceron female     Anterior shoulder:  As above    Umbilical Cord: 3 vessels present    Episiotomy: no   Tear: yesType:midline   Degree: 1st degree   Repair:   2/0 cc in layers             Cord Blood Results:   Information for the patient's :  Kyra Marquez [[id number]]   @AB@      Birth Information:   Information for the patient's :  Lidamckenna Alma [[id number]]      apgars   9,9     Specimens: Placenta was sent     Placenta:    Spontaneous with assist and apparently intact on exam          Complications:  Delay shoulder     Birth Weight: see baby part of chart    Mother's Condition: good  Baby's Condition: good  Sponge and needle count:    correct    I was present for the delivery.   Signed: Nitesh Jones MD      2019

## 2019-03-11 NOTE — PROGRESS NOTES
No change for a while    Monitor:  Reactivity:present Variability:present Baseline:within normal limits  Contractions q 3    Vitals:  Blood pressure 127/68, pulse 76, temperature 98.5 °F (36.9 °C), resp. rate 18, height 5' 2\" (1.575 m), weight 62.1 kg (137 lb), last menstrual period 05/24/2018.      Pelvic exam:  Cervix 8-9, BOW at +2   Effaced: 100%Station:    AROM, clear, after ROM, cx 8cm and -1    Plan:     Continue, pit if does not progress      Signed By: Samara Ray MD                         March 11, 2019

## 2019-03-12 LAB
HCT VFR BLD AUTO: 35.2 % (ref 35–45)
HGB BLD-MCNC: 11.3 G/DL (ref 12–16)

## 2019-03-12 PROCEDURE — 65270000029 HC RM PRIVATE

## 2019-03-12 PROCEDURE — 85018 HEMOGLOBIN: CPT

## 2019-03-12 PROCEDURE — 36415 COLL VENOUS BLD VENIPUNCTURE: CPT

## 2019-03-12 PROCEDURE — 85014 HEMATOCRIT: CPT

## 2019-03-12 PROCEDURE — 74011250637 HC RX REV CODE- 250/637: Performed by: OBSTETRICS & GYNECOLOGY

## 2019-03-12 PROCEDURE — 74011000250 HC RX REV CODE- 250

## 2019-03-12 PROCEDURE — 74011250636 HC RX REV CODE- 250/636

## 2019-03-12 RX ADMIN — IBUPROFEN 800 MG: 400 TABLET, FILM COATED ORAL at 14:15

## 2019-03-12 RX ADMIN — SENNOSIDES AND DOCUSATE SODIUM 1 TABLET: 8.6; 5 TABLET ORAL at 15:37

## 2019-03-12 RX ADMIN — IBUPROFEN 800 MG: 400 TABLET, FILM COATED ORAL at 21:55

## 2019-03-12 NOTE — ANESTHESIA POSTPROCEDURE EVALUATION
3/12/2019  1:07 PM    Laboring Epidural Follow-up Note     Referring physician: Lila Meek MD   Patient status post vaginal delivery with labor epidural    Visit Vitals  /84 (BP 1 Location: Right arm, BP Patient Position: At rest)   Pulse 80   Temp 36.3 °C (97.4 °F)   Resp 18   Ht 5' 2\" (1.575 m)   Wt 62.1 kg (137 lb)   LMP 05/24/2018 (Exact Date)   SpO2 99%   Breastfeeding? Unknown   BMI 25.06 kg/m²       Epidural removed by L&D staff  No sedation, pruritis noted. Adequate analgesia.   No obvious anesthesia complications          Shruthi Ayala, CRNA

## 2019-03-12 NOTE — ADT AUTH CERT NOTES
Patient Name  Nitesh Calles  [id number] Sex  Female   1995 [facility] [address] Phone  [phone] [facility])   CSN:   [id number]   Admit Date: Admit Time Room Bed   Mar 11, 2019  6:12  [[id number]] [id number] [[id number]]   Attending Providers     Provider Pager From To   Alexei Romero MD  19        Birth History     Birth Information   Birth Length: 49.5 cm   Birth Weight: 2.976 kg   Birth Head Circ: 31.5 cm   Gestational Age: 45 6/7 weeks   Delivery Method: Vaginal, Spontaneous   Duration of Labor: 1st: 9h 42m / 2nd: 1h 46m    APGARs   1 Minute: 9   5 Minute: 9

## 2019-03-12 NOTE — PROGRESS NOTES
Progress Note    Patient: Vicki Arango MRN: [id number]  SSN: [id number]    YOB: 1995  Age: 20 y.o. Sex: female    ROOM:  [facility]/01      Subjective:     Postpartum Day: 1            Delivery: vaginal delivery    The patient feels well. The patient denies emotional concerns. The baby is well. Baby is feeding via breast.  The patient is ambulating well. The patient  tolerating a normal diet. Flatus has been passed.     Objective:      Patient Vitals for the past 24 hrs:   BP Temp Pulse Resp SpO2   03/12/19 0101 111/59 98.2 °F (36.8 °C) 90 17 98 %   03/11/19 1927 104/61 99.6 °F (37.6 °C) 92 18 99 %   03/11/19 1838 105/80  (!) 104     03/11/19 1823 116/70  93     03/11/19 1808 123/76  93     03/11/19 1752 120/78  92     03/11/19 1737 130/82  93     03/11/19 1722 129/83  93     03/11/19 1707 125/83  96     03/11/19 1652 128/87  (!) 101     03/11/19 1650 129/79  (!) 101     03/11/19 1623 153/90  (!) 113     03/11/19 1608 (!) 134/102  88     03/11/19 1552 (!) 124/91  85     03/11/19 1538 117/64  78     03/11/19 1522 120/73  72     03/11/19 1507 124/72  72     03/11/19 1453 127/80  91     03/11/19 1438 118/80  77     03/11/19 1422 121/81  83     03/11/19 1408 119/72  71     03/11/19 1352 129/82  76     03/11/19 1338 106/65  93     03/11/19 1322 111/66  95     03/11/19 1310  98.4 °F (36.9 °C)      03/11/19 1308 119/72  85     03/11/19 1252 113/76  87     03/11/19 1237 112/76  90     03/11/19 1222 121/78  93     03/11/19 1209 119/83  86     03/11/19 1153 119/69  82     03/11/19 1137 118/74  91     03/11/19 1122 108/72  78     03/11/19 1107 117/71  72     03/11/19 1052 119/77  96     03/11/19 1037 122/71  71     03/11/19 1023 123/78  93     03/11/19 1007 123/76  97     03/11/19 0952 117/82  72     03/11/19 0938 127/68  76     03/11/19 0935 116/71  72     03/11/19 0933 120/75  95     03/11/19 0930 118/75  85     03/11/19 0927 103/71  96     03/11/19 0925 105/72  93     03/11/19 0923 114/55  100     03/11/19 0922 122/70  88     03/11/19 0810 119/70 98.5 °F (36.9 °C) 60 18    03/11/19 0730 123/84  80       Lochia:  appropriate   Uterine Fundus:   firm   Fundus Location:  -3   Incision:      DVT Evaluation:  No evidence of DVT seen on physical exam.  Negative Foster's sign. No cords or calf tenderness. No significant calf/ankle edema. Lab/Data Review: All lab results for the last 24 hours reviewed. LABS: Recent Results (from the past 48 hour(s))   TYPE & SCREEN    Collection Time: 03/11/19  8:29 AM   Result Value Ref Range    Crossmatch Expiration 03/14/2019     ABO/Rh(D) A POSITIVE     Antibody screen NEG    CBC WITH AUTOMATED DIFF    Collection Time: 03/11/19  8:30 AM   Result Value Ref Range    WBC 10.8 4.6 - 13.2 K/uL    RBC 4.71 4.20 - 5.30 M/uL    HGB 13.5 12.0 - 16.0 g/dL    HCT 41.8 35.0 - 45.0 %    MCV 88.7 74.0 - 97.0 FL    MCH 28.7 24.0 - 34.0 PG    MCHC 32.3 31.0 - 37.0 g/dL    RDW 14.4 11.6 - 14.5 %    PLATELET 826 332 - 615 K/uL    MPV 10.4 9.2 - 11.8 FL    NEUTROPHILS 76 (H) 40 - 73 %    LYMPHOCYTES 16 (L) 21 - 52 %    MONOCYTES 7 3 - 10 %    EOSINOPHILS 1 0 - 5 %    BASOPHILS 0 0 - 2 %    ABS. NEUTROPHILS 8.2 (H) 1.8 - 8.0 K/UL    ABS. LYMPHOCYTES 1.8 0.9 - 3.6 K/UL    ABS. MONOCYTES 0.8 0.05 - 1.2 K/UL    ABS. EOSINOPHILS 0.1 0.0 - 0.4 K/UL    ABS. BASOPHILS 0.0 0.0 - 0.1 K/UL    DF AUTOMATED     HEMATOCRIT    Collection Time: 03/12/19  5:45 AM   Result Value Ref Range    HCT 35.2 35.0 - 45.0 %   HEMOGLOBIN    Collection Time: 03/12/19  5:45 AM   Result Value Ref Range    HGB 11.3 (L) 12.0 - 16.0 g/dL        Assessment:     Status post: Doing well postpartum vaginal delivery     Plan:     Postpartum care discussed including diet, ambulation, and actvitiy restrictions. Discharge instructions and questions answered.        Signed By: Teresa Limon MD     March 12, 2019

## 2019-03-12 NOTE — LACTATION NOTE
RN in room, will return. 1055 Infant latched and nursing well at 1100 with breast sandwiching. Mom educated on breastfeeding basics--hunger cues, feeding on demand, waking baby if baby sleeps too long between feeds, importance of skin to skin, positioning and latching, risk of pacifier use and supplemental feedings, and importance of rooming in--and use of log sheet. Mom also educated on benefits of breastfeeding for herself and baby. Mom verbalized understanding. No questions at this time.

## 2019-03-12 NOTE — PROGRESS NOTES
Assumed care of pt sleeping. 0830-sleeping. 0930- sleeping. 1025-VSS. Assessment completed. Denies needs. 1415-pain med given. 1500-pain med effective  1535-VSS. Senna given. Denies needs. 1630-ambulated to [facility] with infant for bath. 1805-up in room. Denies needs. 1905-Bedside and Verbal shift change report given to SILVESTRE Dan RN  (oncoming nurse) by PEGGY White LPN (offgoing nurse). Report given with SBAR, Kardex, Intake/Output, MAR and Recent Results.

## 2019-03-13 VITALS
BODY MASS INDEX: 25.21 KG/M2 | OXYGEN SATURATION: 100 % | HEART RATE: 61 BPM | HEIGHT: 62 IN | SYSTOLIC BLOOD PRESSURE: 117 MMHG | RESPIRATION RATE: 18 BRPM | DIASTOLIC BLOOD PRESSURE: 69 MMHG | TEMPERATURE: 98.7 F | WEIGHT: 137 LBS

## 2019-03-13 PROBLEM — D62 ANEMIA ASSOCIATED WITH ACUTE BLOOD LOSS: Status: ACTIVE | Noted: 2019-03-13

## 2019-03-13 PROBLEM — Z3A.39 39 WEEKS GESTATION OF PREGNANCY: Status: RESOLVED | Noted: 2019-03-11 | Resolved: 2019-03-13

## 2019-03-13 PROBLEM — Z34.90 INTRAUTERINE PREGNANCY: Status: RESOLVED | Noted: 2019-03-11 | Resolved: 2019-03-13

## 2019-03-13 PROBLEM — Z37.9 NORMAL LABOR: Status: RESOLVED | Noted: 2019-03-11 | Resolved: 2019-03-13

## 2019-03-13 PROCEDURE — 74011250637 HC RX REV CODE- 250/637: Performed by: OBSTETRICS & GYNECOLOGY

## 2019-03-13 RX ORDER — DOCUSATE SODIUM 100 MG/1
200 CAPSULE, LIQUID FILLED ORAL
Qty: 30 CAP | Refills: 1 | Status: SHIPPED | OUTPATIENT
Start: 2019-03-13 | End: 2019-06-11

## 2019-03-13 RX ORDER — IBUPROFEN 800 MG/1
800 TABLET ORAL
Qty: 30 TAB | Refills: 1 | Status: SHIPPED | OUTPATIENT
Start: 2019-03-13

## 2019-03-13 RX ADMIN — IBUPROFEN 800 MG: 400 TABLET, FILM COATED ORAL at 07:49

## 2019-03-13 NOTE — PROGRESS NOTES
Assumed care of pt.  0749-assessment completed. Pain med given. Denies needs. 1030-discharge instructions signed by pt.   1125-discharged home with baby via wheelchair.

## 2019-03-13 NOTE — PROGRESS NOTES
Discharge teaching done for post partum care, infant care, and breasteeding. Concerns and issues addressed with patient/FOB and all answered and verbal understanding voiced by both parents.

## 2019-03-13 NOTE — DISCHARGE INSTRUCTIONS
POST DELIVERY DISCHARGE INSTRUCTIONS    Name: Mac Palomino  YOB: 1995  Primary Diagnosis: Active Problems:    Normal spontaneous vaginal delivery (3/13/2019)      Anemia associated with acute blood loss (3/13/2019)        General:     Diet/Diet Restrictions:  Eight 8-ounce glasses of fluid daily (water, juices); avoid excessive caffeine intake. Meals/snacks as desired which are high in fiber and carbohydrates and low in fat and cholesterol. Physical Activity / Restrictions / Safety:     Avoid heavy lifting, no more that 8 lbs. For 2-3 weeks. Avoid intercourse 4-6 weeks, no douching or tampon use. Check with obstetrician before starting or resuming an exercise program.         Discharge Instructions/Special Treatment/Home Care Needs:     Continue prenatal vitamins. Continue to use squirt bottle with warm water on your episiotomy after each bathroom use until bleeding stops. .    Call your doctor for the following:     Fever over 100.4 degrees by mouth. Vaginal bleeding heavier than a normal menstrual period or clot larger than a golf ball. Red streaks or increased swelling of legs, painful red streaks on your breast.  Painful urination, constipation and increased pain or swelling or discharge with your incision. If you feel extremely anxious or overwhelmed. If you have thoughts of harming yourself and/or your baby. Pain Management:     Pain Management:   Take Acetaminophen (Tylenol) or Ibuprofen (Advil, Motrin), as directed for pain. Use a warm Sitz bath 3 times daily to relieve episiotomy or hemorrhoidal discomfort. Heating pad to  incision as needed. For hemorrhoidal discomfort, use Tucks and Anusol cream as needed and directed. Follow-Up Care:      These are general instructions for a healthy lifestyle:    No smoking/ No tobacco products/ Avoid exposure to second hand smoke    Surgeon General's Warning:  Quitting smoking now greatly reduces serious risk to your health. Obesity, smoking, and sedentary lifestyle greatly increases your risk for illness    A healthy diet, regular physical exercise & weight monitoring are important for maintaining a healthy lifestyle    Recognize signs and symptoms of STROKE:    F-face looks uneven    A-arms unable to move or move unevenly    S-speech slurred or non-existent    T-time-call 911 as soon as signs and symptoms begin-DO NOT go       Back to bed or wait to see if you get better-TIME IS BRAIN. Signed By: Sheila Mendosa LPN                                                                                                   Date: 3/13/2019 Time: 9:27 AM    Patient armband removed and given to patient to take home.   Patient was informed of the privacy risks if armband lost or stolen

## 2019-03-13 NOTE — PROGRESS NOTES
Post-Partum Day Number 2 Progress Note    Rosmery Sims     Assessment:   Hospital Problems  Date Reviewed: 3/13/2019          Codes Class Noted POA    Normal spontaneous vaginal delivery ICD-10-CM: O80  ICD-9-CM: 956  3/13/2019 No        Anemia associated with acute blood loss ICD-10-CM: D62  ICD-9-CM: 285.1  3/13/2019 No            Doing well, post partum day 2    Plan:   1. Discharge home today  2. Follow up in office in 6 weeks with Kalli Graham MD   3. Post partum activity advised, diet as tolerated  4. Discharge Medications: ibuprofen, colace and medications prior to admission    Information for the patient's :  Kyra Marquez [[id number]]   Vaginal, Spontaneous   Patient doing well without significant complaint. Voiding without difficulty, normal lochia. Breast feeding without problems. Desires ibuprofen and colace on discharge. Current Facility-Administered Medications   Medication Dose Route Frequency    oxytocin (PITOCIN) 30 units/500 ml NS  0-20 michael-units/min IntraVENous TITRATE       Vitals:  Visit Vitals  /69 (BP 1 Location: Right arm, BP Patient Position: During activity) Comment (BP Patient Position): breast feeding   Pulse 61   Temp 98.7 °F (37.1 °C)   Resp 18   Ht 5' 2\" (1.575 m)   Wt 137 lb (62.1 kg)   LMP 2018 (Exact Date)   SpO2 100%   Breastfeeding? Unknown   BMI 25.06 kg/m²     Temp (24hrs), Av.9 °F (36.6 °C), Min:97.4 °F (36.3 °C), Max:98.7 °F (37.1 °C)      Exam:         Patient without distress. Abdomen soft, fundus firm, nontender                 Lower extremities are negative for swelling, cords or tenderness.     Labs:     Lab Results   Component Value Date/Time    WBC 10.8 2019 08:30 AM    WBC 15.5 (H) 10/11/2018 09:50 PM    HGB 11.3 (L) 2019 05:45 AM    HGB 13.5 2019 08:30 AM    HGB 10.9 (L) 10/11/2018 09:50 PM    HCT 35.2 2019 05:45 AM    HCT 41.8 2019 08:30 AM    HCT 31.8 (L) 10/11/2018 09:50 PM PLATELET 568 84/44/8067 08:30 AM    PLATELET 296 29/32/6072 09:50 PM       No results found for this or any previous visit (from the past 24 hour(s)).

## 2019-03-13 NOTE — PROGRESS NOTES
Bedside and Verbal shift change report given to ANA OrtizRN (oncoming nurse) by SILVESTRE Quiros RN (offgoing nurse). Report given with Jennifer HANSON and MAR.

## 2019-03-13 NOTE — DISCHARGE SUMMARY
Obstetrical Discharge Summary     Name: Vicki Arango MRN: [id number]  SSN: [id number]    YOB: 1995  Age: 20 y.o. Sex: female      Admit Date: 3/11/2019    Discharge Date: 3/13/2019    Admitting Physician: Lila Meek MD     Attending Physician:  Ran Haas MD     Discharge Diagnoses:   Information for the patient's :  Carin Peter [[id number]]   Delivery of a 6 lb 9 oz (2.976 kg) female infant via Vaginal, Spontaneous on 3/11/2019 at 4:28 PM  by . Apgars were 9 and 9. Additional Diagnoses:   Problem List as of 3/13/2019 Date Reviewed: 3/13/2019          Codes Class Noted - Resolved    Normal spontaneous vaginal delivery ICD-10-CM: O80  ICD-9-CM: 650  3/13/2019 - Present        Anemia associated with acute blood loss ICD-10-CM: D62  ICD-9-CM: 285.1  3/13/2019 - Present        RESOLVED: Normal labor ICD-10-CM: Gabe Krabbe, Z37.9  ICD-9-CM: 596  3/11/2019 - 3/13/2019        RESOLVED: 39 weeks gestation of pregnancy ICD-10-CM: Z3A.39  ICD-9-CM: V22.2  3/11/2019 - 3/13/2019        * (Principal) RESOLVED: Intrauterine pregnancy ICD-10-CM: Z34.90  ICD-9-CM: V22.2  3/11/2019 - 3/13/2019              Lab Results   Component Value Date/Time    Rubella, External immune 08/15/2018    GrBStrep, External negative 2019     Recent Labs     19  0545   HGB 11.3*       Hospital Course: Normal hospital course following the delivery. Patient Instructions:   Current Discharge Medication List      START taking these medications    Details   ibuprofen (MOTRIN) 800 mg tablet Take 1 Tab by mouth every eight (8) hours as needed for Pain. Qty: 30 Tab, Refills: 1      docusate sodium (COLACE) 100 mg capsule Take 2 Caps by mouth nightly for 90 days. Qty: 30 Cap, Refills: 1         CONTINUE these medications which have NOT CHANGED    Details   PNV66-Iron Fumarate-FA-DSS-DHA 26-1.2- mg cap Take 1 Tab by mouth. cholecalciferol, vitamin D3, (VITAMIN D3) 2,000 unit tab Take 1 Tab by mouth. ferrous sulfate (IRON) 325 mg (65 mg iron) tablet Take 325 mg by mouth Daily (before breakfast). Reference my discharge instructions. Follow-up Appointments   Procedures    FOLLOW UP VISIT Appointment in: 6 Weeks     Standing Status:   Standing     Number of Occurrences:   1     Order Specific Question:   Appointment in     Answer:   6 Weeks        Signed By:  Yancy Gallardo MD     2019                     Obstetrical Discharge Summary     Name: Roman Craft MRN: [id number]  SSN: [id number]    YOB: 1995  Age: 20 y.o. Sex: female      Admit Date: 3/11/2019    Discharge Date: 3/13/2019    Admitting Physician: Kenia Salazar MD     Attending Physician:  Ashley Aguero MD     Discharge Diagnoses:   Information for the patient's :  Franklyn Fulton [[id number]]   Delivery of a 6 lb 9 oz (2.976 kg) female infant via Vaginal, Spontaneous on 3/11/2019 at 4:28 PM  by . Apgars were 9 and 9. Additional Diagnoses:   Problem List as of 3/13/2019 Date Reviewed: 3/13/2019          Codes Class Noted - Resolved    Normal spontaneous vaginal delivery ICD-10-CM: O80  ICD-9-CM: 650  3/13/2019 - Present        Anemia associated with acute blood loss ICD-10-CM: D62  ICD-9-CM: 285.1  3/13/2019 - Present        RESOLVED: Normal labor ICD-10-CM: Sheryl Alvarado, Z37.9  ICD-9-CM: 663  3/11/2019 - 3/13/2019        RESOLVED: 39 weeks gestation of pregnancy ICD-10-CM: Z3A.39  ICD-9-CM: V22.2  3/11/2019 - 3/13/2019        * (Principal) RESOLVED: Intrauterine pregnancy ICD-10-CM: Z34.90  ICD-9-CM: V22.2  3/11/2019 - 3/13/2019              Lab Results   Component Value Date/Time    Rubella, External immune 08/15/2018    GrBStrep, External negative 2019     Recent Labs     19  0545   HGB 11.3*       Hospital Course: Normal hospital course following the delivery.     Patient Instructions:   Current Discharge Medication List      START taking these medications    Details   ibuprofen (MOTRIN) 800 mg tablet Take 1 Tab by mouth every eight (8) hours as needed for Pain. Qty: 30 Tab, Refills: 1      docusate sodium (COLACE) 100 mg capsule Take 2 Caps by mouth nightly for 90 days. Qty: 30 Cap, Refills: 1         CONTINUE these medications which have NOT CHANGED    Details   PNV66-Iron Fumarate-FA-DSS-DHA 26-1.2- mg cap Take 1 Tab by mouth. cholecalciferol, vitamin D3, (VITAMIN D3) 2,000 unit tab Take 1 Tab by mouth. ferrous sulfate (IRON) 325 mg (65 mg iron) tablet Take 325 mg by mouth Daily (before breakfast). Reference my discharge instructions.     Follow-up Appointments   Procedures    FOLLOW UP VISIT Appointment in: 6 Weeks     Standing Status:   Standing     Number of Occurrences:   1     Order Specific Question:   Appointment in     Answer:   6 Weeks        Signed By:  Azucena Montez MD     March 13, 2019                       BST

## 2019-03-13 NOTE — ROUTINE PROCESS
Bedside and Verbal shift change report given to PEGGY White LPN (oncoming nurse) by ANA Barlow RN (offgoing nurse). Report included the following information SBAR, Kardex, Intake/Output, MAR and Recent Results.